# Patient Record
Sex: FEMALE | Race: WHITE | NOT HISPANIC OR LATINO | URBAN - METROPOLITAN AREA
[De-identification: names, ages, dates, MRNs, and addresses within clinical notes are randomized per-mention and may not be internally consistent; named-entity substitution may affect disease eponyms.]

---

## 2020-08-10 ENCOUNTER — OFFICE VISIT (OUTPATIENT)
Dept: URGENT CARE | Facility: CLINIC | Age: 55
End: 2020-08-10
Payer: COMMERCIAL

## 2020-08-10 VITALS
WEIGHT: 208 LBS | OXYGEN SATURATION: 100 % | BODY MASS INDEX: 34.66 KG/M2 | RESPIRATION RATE: 20 BRPM | HEIGHT: 65 IN | TEMPERATURE: 98.1 F | SYSTOLIC BLOOD PRESSURE: 120 MMHG | HEART RATE: 71 BPM | DIASTOLIC BLOOD PRESSURE: 76 MMHG

## 2020-08-10 DIAGNOSIS — R21 RASH: Primary | ICD-10-CM

## 2020-08-10 PROCEDURE — 99213 OFFICE O/P EST LOW 20 MIN: CPT | Performed by: FAMILY MEDICINE

## 2020-08-10 RX ORDER — DIAPER,BRIEF,INFANT-TODD,DISP
EACH MISCELLANEOUS
Qty: 28.4 G | Refills: 0 | Status: SHIPPED | COMMUNITY
Start: 2020-08-10 | End: 2021-07-19

## 2020-08-10 RX ORDER — PRAVASTATIN SODIUM 40 MG
TABLET ORAL
COMMUNITY
Start: 2020-07-06

## 2020-08-10 RX ORDER — LOSARTAN POTASSIUM 25 MG/1
TABLET ORAL
COMMUNITY
Start: 2020-08-02

## 2020-08-11 NOTE — PATIENT INSTRUCTIONS
Rash appears to be a possible reaction to the sun post-radiation, or a possible skin irritation from plants/grass  There are no signs of a cellulitis or an allergic contact dermatitis present on exam at this time  I have advised the patient to wash the areas with soap and water, keep clean and dry, rest the legs and elevate them, apply ice to the areas, and may use a topical 1% Hydrocortisone cream if needed (dispensed in office)  If rash persists despite treatment, follow up w/ PCP for re-check, if rash worsens/spreads, or any new symptoms present, should be seen in the ER

## 2020-08-11 NOTE — PROGRESS NOTES
[facility]        NAME: Denny Hewitt is a 49 y o  female  : 1965    MRN: [id number]  DATE: August 10, 2020  TIME: 8:59 AM    Assessment and Plan   Rash [R21]  1  Rash  hydrocortisone 1 % cream         Patient Instructions     Patient Instructions   Rash appears to be a possible reaction to the sun post-radiation, or a possible skin irritation from plants/grass  There are no signs of a cellulitis or an allergic contact dermatitis present on exam at this time  I have advised the patient to wash the areas with soap and water, keep clean and dry, rest the legs and elevate them, apply ice to the areas, and may use a topical 1% Hydrocortisone cream if needed (dispensed in office)  If rash persists despite treatment, follow up w/ PCP for re-check, if rash worsens/spreads, or any new symptoms present, should be seen in the ER  Follow up with PCP in 3-5 days  Proceed to  ER if symptoms worsen  Chief Complaint     Chief Complaint   Patient presents with    Rash     bilateral lower legs         History of Present Illness       48 yo female presents for a rash present on her bilateral lower legs that she states has been present for about 2 hours  She describes the rash as red patches  Not painful or pruritic  No drainage or oozing from the rash  No known bug bites  She does state she spent most of the day outdoors today and was wearing roselyn pants and the rash is only present on the part of her legs that were not covered w/ clothing  No rash anywhere else on the body  No pain or swelling of the legs  No fever/chills  No headache or body aches  No joint pains or fatigue  No anaphylaxis symptoms  No chest pain or SOB  She has not attempted any treatment for the symptoms  She denies any known history of allergy to anything except a chemotherapy medication (Paclitaxel)   She states she received radiation therapy for breast cancer and since the radiation her skin has generally been very sensitive and prone to sunburn and easily reacts to irritants  Review of Systems   Review of Systems   Constitutional: Negative  HENT: Negative  Eyes: Negative  Respiratory: Negative  Cardiovascular: Negative  Gastrointestinal: Negative  Musculoskeletal: Negative  Skin: Positive for rash  As noted in HPI   Neurological: Negative  Hematological: Negative  Current Medications       Current Outpatient Medications:     losartan (COZAAR) 25 mg tablet, TK 1 T PO QD, Disp: , Rfl:     pravastatin (PRAVACHOL) 40 mg tablet, TK 1 T PO QD, Disp: , Rfl:     hydrocortisone 1 % cream, Use as directed on the bix, Disp: 28 4 g, Rfl: 0    Current Allergies     Allergies as of 08/10/2020 - Reviewed 08/10/2020   Allergen Reaction Noted    Paclitaxel Anaphylaxis 08/10/2020            The following portions of the patient's history were reviewed and updated as appropriate: allergies, current medications, past family history, past medical history, past social history, past surgical history and problem list      Past Medical History:   Diagnosis Date    Breast cancer ([facility] [address] )     Neuropathy        Past Surgical History:   Procedure Laterality Date    BREAST BIOPSY      right side     SECTION      CHOLECYSTECTOMY         Family History   Problem Relation Age of Onset    Diabetes Mother     Hypertension Mother     Hyperlipidemia Mother     Hyperlipidemia Father     Hypertension Father          Medications have been verified  Objective   /76 (BP Location: Right arm, Patient Position: Sitting, Cuff Size: Large)   Pulse 71   Temp 98 1 °F (36 7 °C) (Tympanic)   Resp 20   Ht 5' 4 5" (1 638 m)   Wt 94 3 kg (208 lb)   SpO2 100%   BMI 35 15 kg/m²        Physical Exam     Physical Exam  Vitals signs and nursing note reviewed  Constitutional:       General: She is awake  She is not in acute distress  Appearance: Normal appearance   She is well-developed, well-groomed and normal weight  She is not ill-appearing, toxic-appearing or diaphoretic  HENT:      Head: Normocephalic and atraumatic  Nose: Nose normal       Mouth/Throat:      Lips: Pink  Mouth: Mucous membranes are moist       Pharynx: Oropharynx is clear  Uvula midline  Cardiovascular:      Rate and Rhythm: Normal rate  Pulmonary:      Effort: Pulmonary effort is normal  No tachypnea, accessory muscle usage or respiratory distress  Musculoskeletal: Normal range of motion  General: No swelling, tenderness, deformity or signs of injury  Right lower leg: No edema  Left lower leg: No edema  Skin:     General: Skin is warm and dry  Capillary Refill: Capillary refill takes less than 2 seconds  Coloration: Skin is not cyanotic, mottled or pale  Findings: Rash present  No abrasion, bruising, burn, ecchymosis, signs of injury or wound  Comments: Right lower leg: there is a rash present on the right lower leg present from about the mid calf to the start of the ankle  Rash appears to be slightly raised patches of erythema with a few very small scattered petechiae present  Non-tender  No drainage/oozing  No open wounds  No bull's eye rash  Skin is not hot to touch  Left lower leg:  there is a rash present on the right lower leg present from about the mid calf to the start of the ankle  Rash appears to be slightly raised patches of erythema with a few very small scattered petechiae present  Non-tender  No drainage/oozing  No open wounds  No bull's eye rash  Skin is not hot to touch  Neurological:      Mental Status: She is alert and oriented to person, place, and time  Mental status is at baseline  Gait: Gait is intact  Psychiatric:         Attention and Perception: Attention and perception normal          Mood and Affect: Mood and affect normal          Speech: Speech normal          Behavior: Behavior normal  Behavior is cooperative  Thought Content:  Thought content normal          Cognition and Memory: Cognition and memory normal          Judgment: Judgment normal

## 2021-07-19 ENCOUNTER — OFFICE VISIT (OUTPATIENT)
Dept: URGENT CARE | Facility: CLINIC | Age: 56
End: 2021-07-19
Payer: COMMERCIAL

## 2021-07-19 VITALS
TEMPERATURE: 97.8 F | HEIGHT: 65 IN | SYSTOLIC BLOOD PRESSURE: 118 MMHG | DIASTOLIC BLOOD PRESSURE: 74 MMHG | BODY MASS INDEX: 35.82 KG/M2 | WEIGHT: 215 LBS | RESPIRATION RATE: 18 BRPM | HEART RATE: 67 BPM | OXYGEN SATURATION: 100 %
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
## 2021-07-19 DIAGNOSIS — S80.861A INSECT BITE OF RIGHT LEG, INITIAL ENCOUNTER: Primary | ICD-10-CM

## 2021-07-19 PROCEDURE — 99213 OFFICE O/P EST LOW 20 MIN: CPT | Performed by: FAMILY MEDICINE

## 2021-07-19 NOTE — PROGRESS NOTES
[facility]        NAME: Alverto Morrison is a 48 y o  female  : 1965    MRN: [id number]  DATE: 2021  TIME: 4:58 PM    Assessment and Plan   Insect bite of right leg, initial encounter [D29 752L, W57  XXXA]  1  Insect bite of right leg, initial encounter           Patient Instructions     Patient Instructions   Patient has an insect bite of the right lower leg  There are no signs or symptoms of an infection (cellulitis), a venomous spider bite, or a tick bite present on exam at this time  I have advised the patient to gently wash the site with soap and water daily, keep clean and dry, apply topical Neosporin to the site, apply topical anti-itch cream to the site as needed, and may cover with a band-aid if needed  If symptoms persist despite treatment, worsen, change, or any new symptoms present, patient is to be seen by PCP for re-check  Follow up with PCP in 3-5 days  Proceed to  ER if symptoms worsen  Chief Complaint     Chief Complaint   Patient presents with    Insect Bite     Pt here for an insect bite to right lower leg         History of Present Illness       49 yo female presents for an insect bite of the right lower leg that she saw yesterday  She was working outdoors in her yard the day before and thinks she might have gotten bit by something  She has a small, slightly red, raised area on the right tibial region  She states it is itchy  She denies any associated pain  No bleeding or drainage from the site  She denies any swelling or redness of the surrounding soft tissues  No bruising or skin discoloration  No bull's eye appearance  No known tick bites  No fever/chills  No headache or body aches  No joint pains or fatigue  No diffuse hives or skin rashes  No chest pain or SOB  No headache or dizziness  No anaphylaxis symptoms  No abdominal pain or GI symptoms  She feels well otherwise with no other complaints   She states she has put Vaseline over it, and applied alcohol and Hydrogen Peroxide  She states she is concerned if it could possibly be a venomous spider bite  She did not see any spiders or ticks on her, in her home, or in the area where she was working outdoors  Review of Systems   Review of Systems   Constitutional: Negative  HENT: Negative  Eyes: Negative  Respiratory: Negative  Cardiovascular: Negative  Gastrointestinal: Negative  Musculoskeletal: Negative  Skin:        As noted in HPI   Allergic/Immunologic:        Paclitaxel   Neurological: Negative  Hematological:        History of breast cancer         Current Medications       Current Outpatient Medications:     losartan (COZAAR) 25 mg tablet, TK 1 T PO QD, Disp: , Rfl:     pravastatin (PRAVACHOL) 40 mg tablet, TK 1 T PO QD, Disp: , Rfl:     Current Allergies     Allergies as of 2021 - Reviewed 2021   Allergen Reaction Noted    Paclitaxel Anaphylaxis 08/10/2020            The following portions of the patient's history were reviewed and updated as appropriate: allergies, current medications, past family history, past medical history, past social history, past surgical history and problem list      Past Medical History:   Diagnosis Date    Breast cancer ([facility] [address] )     Hypertension     Neuropathy        Past Surgical History:   Procedure Laterality Date    BREAST BIOPSY      right side     SECTION      CHOLECYSTECTOMY         Family History   Problem Relation Age of Onset    Diabetes Mother     Hypertension Mother     Hyperlipidemia Mother     Hyperlipidemia Father     Hypertension Father          Medications have been verified  Objective   /74 (BP Location: Right arm, Patient Position: Sitting, Cuff Size: Standard)   Pulse 67   Temp 97 8 °F (36 6 °C) (Tympanic)   Resp 18   Ht 5' 4 5" (1 638 m)   Wt 97 5 kg (215 lb)   SpO2 100%   BMI 36 33 kg/m²   No LMP recorded  Physical Exam     Physical Exam  Vitals and nursing note reviewed  Constitutional:       General: She is awake  She is not in acute distress  Appearance: Normal appearance  She is well-developed, well-groomed and normal weight  She is not ill-appearing, toxic-appearing or diaphoretic  Cardiovascular:      Rate and Rhythm: Normal rate  Pulses: Normal pulses  Pulmonary:      Effort: Pulmonary effort is normal  No tachypnea, accessory muscle usage or respiratory distress  Musculoskeletal:         General: No swelling, tenderness, deformity or signs of injury  Normal range of motion  Skin:     General: Skin is warm and dry  Capillary Refill: Capillary refill takes less than 2 seconds  Coloration: Skin is not cyanotic, mottled or pale  Findings: Erythema present  No abscess, bruising, ecchymosis, rash or wound  Comments: Right lower leg: there is a small localized round papule (~2 cm) in diameter on the right anterior lower tibial region  Erythematous, slightly raised  Non-tender to touch  No drainage or bleeding from the site  No apparent "bite marks" noted  No visible or palpable retained tick parts noted  Skin is appropriately warm  No surrounding swelling, erythema, or bruising  No signs of tissue necrosis or skin discoloration  No bull's eye rash  No abscess  Strength and sensations intact  Good capillary refill  Neurological:      Mental Status: She is alert and oriented to person, place, and time  Mental status is at baseline  Psychiatric:         Attention and Perception: Attention and perception normal          Mood and Affect: Mood and affect normal          Speech: Speech normal          Behavior: Behavior normal  Behavior is cooperative  Thought Content:  Thought content normal          Cognition and Memory: Cognition and memory normal          Judgment: Judgment normal

## 2021-07-19 NOTE — PATIENT INSTRUCTIONS
Patient has an insect bite of the right lower leg  There are no signs or symptoms of an infection (cellulitis), a venomous spider bite, or a tick bite present on exam at this time  I have advised the patient to gently wash the site with soap and water daily, keep clean and dry, apply topical Neosporin to the site, apply topical anti-itch cream to the site as needed, and may cover with a band-aid if needed  If symptoms persist despite treatment, worsen, change, or any new symptoms present, patient is to be seen by PCP for re-check

## 2024-12-20 ENCOUNTER — OFFICE VISIT (OUTPATIENT)
Dept: URGENT CARE | Facility: CLINIC | Age: 59
End: 2024-12-20
Payer: COMMERCIAL

## 2024-12-20 VITALS
OXYGEN SATURATION: 99 % | BODY MASS INDEX: 34.92 KG/M2 | WEIGHT: 209.6 LBS | SYSTOLIC BLOOD PRESSURE: 120 MMHG | TEMPERATURE: 99.4 F | DIASTOLIC BLOOD PRESSURE: 78 MMHG | HEART RATE: 88 BPM | RESPIRATION RATE: 20 BRPM | HEIGHT: 65 IN

## 2024-12-20 DIAGNOSIS — J01.00 ACUTE NON-RECURRENT MAXILLARY SINUSITIS: Primary | ICD-10-CM

## 2024-12-20 PROCEDURE — 99213 OFFICE O/P EST LOW 20 MIN: CPT | Performed by: PHYSICIAN ASSISTANT

## 2024-12-20 RX ORDER — DOXYCYCLINE 100 MG/1
100 CAPSULE ORAL 2 TIMES DAILY
Qty: 14 CAPSULE | Refills: 0 | Status: SHIPPED | OUTPATIENT
Start: 2024-12-20 | End: 2024-12-27

## 2024-12-20 NOTE — PROGRESS NOTES
[facility]        NAME: Liz Pimentel is a 59 y.o. female  : 1965    MRN: [id number]  DATE: 2024  TIME: 10:43 AM    Assessment and Plan   Acute non-recurrent maxillary sinusitis [J01.00]  1. Acute non-recurrent maxillary sinusitis  doxycycline monohydrate (MONODOX) 100 mg capsule            Patient Instructions   Acute sinusitis:  -The patients hx and presentation are consistent with sinusitis. Will treat with Doxycycline 100mg taken as prescribed.Take with food and a probiotic.  -Stay well hydrated and rest  -Run a humidifier by your bed and take steam showers to clear mucus.  -Warm facial compresses or hot soups or teas may be comforting  -You can use flonase once daily for two weeks.  -plain mucinex can aid with symptoms. Take with plenty of water. These may help to thin nasal secretions and promote mucus drainage.   -Continue your daily allergy medications or use claritin, zyrtec to dry out mucus.   -You can attempt to use  a nasal rinse/nasal saline spray with the flonase.  -Advil or Tylenol for pain  -If your sx worsen or persist despite these measures see your PCP for follow up or consider ENT evaluation         Follow up with PCP in 3-5 days.  Proceed to  ER if symptoms worsen.    If tests have been performed at [facility], our office will contact you with results if changes need to be made to the care plan discussed with you at the visit.  You can review your full results on [facility].    Chief Complaint     Chief Complaint   Patient presents with    Cold Like Symptoms     Pt here ill x 1 week  pt states  s/s  nasal congestion, sinus pain  and pressure, tooth pain,  cough,  ear pressure last night.   Pt used Mucinex and Tylenol cold and Flu.         History of Present Illness       The patient is a 59-year-old female who presents today for a one week hx of nasal congestion and rhinorrhea that has now progressed to sinus pressure and pain. She states that the pain  radiates to her teeth. Additionally, the patient reports bilateral ear pressure that began last night.   No fever, chills, body aches. No dyspnea, wheezing, chest tightness, cp, palpitations. No dizziness or weakness. No nausea, vomiting, diarrhea, constipation, abdominal pain.   No stridor or drooling. No rash. No sweats or diaphoresis.  Good PO intake. No loss of taste or smell. No lower extremity edema. No hx of asthma or smoking. She states that her grandchildren have been ill with similar sx. She states that she has been taking mucinex.             Review of Systems   Review of Systems   Constitutional:  Positive for fatigue. Negative for activity change, appetite change, chills, diaphoresis and fever.   HENT:  Positive for congestion, postnasal drip, rhinorrhea, sinus pressure and sinus pain. Negative for ear discharge, ear pain, facial swelling, hearing loss, sore throat, tinnitus, trouble swallowing and voice change.    Eyes:  Negative for visual disturbance.   Respiratory:  Positive for cough. Negative for apnea, chest tightness, shortness of breath, wheezing and stridor.    Cardiovascular:  Negative for chest pain, palpitations and leg swelling.   Gastrointestinal:  Negative for abdominal distention and abdominal pain.   Genitourinary:  Negative for decreased urine volume.   Musculoskeletal:  Negative for arthralgias, joint swelling, myalgias, neck pain and neck stiffness.   Skin:  Negative for rash.   Allergic/Immunologic: Negative for immunocompromised state.   Neurological:  Negative for dizziness, weakness, light-headedness, numbness and headaches.   Hematological:  Negative for adenopathy.         Current Medications       Current Outpatient Medications:     doxycycline monohydrate (MONODOX) 100 mg capsule, Take 1 capsule (100 mg total) by mouth 2 (two) times a day for 7 days, Disp: 14 capsule, Rfl: 0    losartan (COZAAR) 25 mg tablet, TK 1 T PO QD, Disp: , Rfl:     pravastatin (PRAVACHOL) 40 mg  "tablet, TK 1 T PO QD, Disp: , Rfl:     Current Allergies     Allergies as of 2024 - Reviewed 2024   Allergen Reaction Noted    Paclitaxel Anaphylaxis 08/10/2020            The following portions of the patient's history were reviewed and updated as appropriate: allergies, current medications, past family history, past medical history, past social history, past surgical history and problem list.     Past Medical History:   Diagnosis Date    Breast cancer (HCC)     Hypertension     Neuropathy        Past Surgical History:   Procedure Laterality Date    BREAST BIOPSY      right side     SECTION      CHOLECYSTECTOMY      HYSTERECTOMY         Family History   Problem Relation Age of Onset    Diabetes Mother     Hypertension Mother     Hyperlipidemia Mother     Hyperlipidemia Father     Hypertension Father          Medications have been verified.        Objective   /78 (Patient Position: Sitting, Cuff Size: Standard)   Pulse 88   Temp 99.4 °F (37.4 °C) (Tympanic)   Resp 20   Ht 5' 4.5\" (1.638 m)   Wt 95.1 kg (209 lb 9.6 oz)   SpO2 99%   BMI 35.42 kg/m²   No LMP recorded.       Physical Exam     Physical Exam  Vitals and nursing note reviewed.   Constitutional:       General: She is not in acute distress.     Appearance: She is well-developed. She is not ill-appearing, toxic-appearing or diaphoretic.   HENT:      Head: Normocephalic and atraumatic.      Right Ear: Hearing, tympanic membrane, ear canal and external ear normal.      Left Ear: Hearing, tympanic membrane, ear canal and external ear normal.      Nose: Congestion present. No mucosal edema or rhinorrhea.      Right Sinus: Maxillary sinus tenderness present. No frontal sinus tenderness.      Left Sinus: Maxillary sinus tenderness present. No frontal sinus tenderness.      Mouth/Throat:      Lips: Pink.      Mouth: Mucous membranes are moist.      Pharynx: Uvula midline. No pharyngeal swelling, oropharyngeal exudate, posterior " oropharyngeal erythema, uvula swelling or postnasal drip.      Tonsils: No tonsillar exudate or tonsillar abscesses. 1+ on the right. 1+ on the left.   Cardiovascular:      Rate and Rhythm: Normal rate and regular rhythm.      Heart sounds: Normal heart sounds, S1 normal and S2 normal. Heart sounds not distant. No murmur heard.     No friction rub. No gallop. No S3 or S4 sounds.   Pulmonary:      Effort: Pulmonary effort is normal. No tachypnea, bradypnea, accessory muscle usage or respiratory distress.      Breath sounds: Normal breath sounds and air entry. No stridor, decreased air movement or transmitted upper airway sounds. No decreased breath sounds, wheezing, rhonchi or rales.   Musculoskeletal:      Cervical back: Normal range of motion and neck supple.   Lymphadenopathy:      Cervical: No cervical adenopathy.   Neurological:      Mental Status: She is alert and oriented to person, place, and time.   Psychiatric:         Behavior: Behavior normal.

## 2024-12-20 NOTE — PATIENT INSTRUCTIONS
Acute sinusitis:  -The patients hx and presentation are consistent with sinusitis. Will treat with Doxycycline 100mg taken as prescribed.Take with food and a probiotic.  -Stay well hydrated and rest  -Run a humidifier by your bed and take steam showers to clear mucus.  -Warm facial compresses or hot soups or teas may be comforting  -You can use flonase once daily for two weeks.  -plain mucinex can aid with symptoms. Take with plenty of water. These may help to thin nasal secretions and promote mucus drainage.   -Continue your daily allergy medications or use claritin, zyrtec to dry out mucus.   -You can attempt to use  a nasal rinse/nasal saline spray with the flonase.  -Advil or Tylenol for pain  -If your sx worsen or persist despite these measures see your PCP for follow up or consider ENT evaluation